# Patient Record
Sex: FEMALE | Employment: OTHER
[De-identification: names, ages, dates, MRNs, and addresses within clinical notes are randomized per-mention and may not be internally consistent; named-entity substitution may affect disease eponyms.]

---

## 2017-06-17 ENCOUNTER — HEALTH MAINTENANCE LETTER (OUTPATIENT)
Age: 52
End: 2017-06-17

## 2020-09-29 ENCOUNTER — MEDICAL CORRESPONDENCE (OUTPATIENT)
Dept: HEALTH INFORMATION MANAGEMENT | Facility: CLINIC | Age: 55
End: 2020-09-29

## 2020-09-29 ENCOUNTER — TRANSFERRED RECORDS (OUTPATIENT)
Dept: HEALTH INFORMATION MANAGEMENT | Facility: CLINIC | Age: 55
End: 2020-09-29

## 2020-09-30 ENCOUNTER — TRANSCRIBE ORDERS (OUTPATIENT)
Dept: OTHER | Age: 55
End: 2020-09-30

## 2020-09-30 DIAGNOSIS — I63.9 LEFT PONTINE STROKE (H): ICD-10-CM

## 2020-09-30 DIAGNOSIS — H53.2 DIPLOPIA: Primary | ICD-10-CM

## 2020-10-01 NOTE — TELEPHONE ENCOUNTER
FUTURE VISIT INFORMATION      FUTURE VISIT INFORMATION:    Date: 10/14/20    Time: 9:30am    [place] [facility] [place] INFORMATION:    Referring provider:  Dr. Llanos    Referring providers clinic:  [facility]    Reason for visit/diagnosis  diplopia after left pontine stroke    RECORDS REQUESTED FROM:       Clinic name Comments Records Status Imaging Status   [facility] Request for recs sent 10/1- received and sent to scanning EPIC

## 2020-10-14 ENCOUNTER — PRE VISIT (OUTPATIENT)
Dept: OPHTHALMOLOGY | Facility: CLINIC | Age: 55
End: 2020-10-14

## 2020-10-14 ENCOUNTER — OFFICE VISIT (OUTPATIENT)
Dept: OPHTHALMOLOGY | Facility: CLINIC | Age: 55
End: 2020-10-14

## 2020-10-14 DIAGNOSIS — I63.9 LEFT PONTINE STROKE (H): ICD-10-CM

## 2020-10-14 DIAGNOSIS — H53.2 DIPLOPIA: ICD-10-CM

## 2020-10-14 DIAGNOSIS — H53.10 SUBJECTIVE VISUAL DISTURBANCE: Primary | ICD-10-CM

## 2020-10-14 DIAGNOSIS — E11.3292 TYPE 2 DIABETES MELLITUS WITH LEFT EYE AFFECTED BY MILD NONPROLIFERATIVE RETINOPATHY WITHOUT MACULAR EDEMA, WITHOUT LONG-TERM CURRENT USE OF INSULIN (H): ICD-10-CM

## 2020-10-14 DIAGNOSIS — H49.21 SIXTH NERVE PALSY, RIGHT: Primary | ICD-10-CM

## 2020-10-14 PROCEDURE — 99244 OFF/OP CNSLTJ NEW/EST MOD 40: CPT | Performed by: OPHTHALMOLOGY

## 2020-10-14 PROCEDURE — 92060 SENSORIMOTOR EXAMINATION: CPT | Performed by: OPHTHALMOLOGY

## 2020-10-14 ASSESSMENT — CONF VISUAL FIELD
OD_NORMAL: 1
OS_NORMAL: 1

## 2020-10-14 ASSESSMENT — SLIT LAMP EXAM - LIDS
COMMENTS: MILD BLEPHARITIS
COMMENTS: MILD BLEPHARITIS

## 2020-10-14 ASSESSMENT — TONOMETRY
IOP_METHOD: ICARE
OS_IOP_MMHG: 15
OD_IOP_MMHG: 16

## 2020-10-14 ASSESSMENT — VISUAL ACUITY
OD_SC: 20/25
OS_SC: 20/25
OS_SC+: -2
OD_SC+: -2
METHOD: SNELLEN - LINEAR

## 2020-10-14 ASSESSMENT — CUP TO DISC RATIO
OD_RATIO: 0.3
OS_RATIO: 0.3

## 2020-10-14 NOTE — Clinical Note
10/14/2020       RE: Jayda Morris  [address] [place] [id number]     Dear Colleague,    Thank you for referring your patient, Jayda Morris, to the [facility]PHTH[facility] [place] [facility]. Please see a copy of my visit note below.         Assessment & Plan     Jayda Morris is a 55 year old female with the following diagnoses:   1. Diplopia    2. Left pontine stroke (H)    3. Subjective visual disturbance         Patient was sent for consultation by Dr. Tad Reed for diplopia.  This began 3 weeks ago.  It has been improving since onset.  In July had a left pontine stroke.  She had developed right arm weakness, right facial droop, slurred speech       IMAGING:  EXAM: MRI BRAIN, [place] OF SAL[place] MRA, CAROTID MRA  7/2/2020 4:58 PM.     CLINICAL INFORMATION: .  Right face droop, tongue deviation and right upper extremity weakness     COMPARISON: CT head without contrast July 2, 2020     TECHNIQUE:  BRAIN: Sagittal T1, axial DWI, axial and coronal FLAIR, axial T2, axial MPGR.  [place] OF [place] MRA: 3-D time-of-flight.  CAROTID MRA: Unenhanced and enhanced time-of-flight.     CONTRAST: 10 cc of intravenous Gadavist was administered during the exam.     FINDINGS:     BRAIN:  No aggressive marrow lesion. Corpus callosum normally formed. Posterior fossa structures normally positioned. Sella and pituitary gland unremarkable. A 16 mm acute infarct left edin. No hemorrhage or mass effect. Mild parenchymal volume loss. Mild to moderate chronic microvascular ischemic change. No hydrocephalus. Mild inflammatory mucosal thickening in the ethmoid air cells. Nonspecific foci susceptibility along the left cerebellar tonsil probably related to chronic microhemorrhage.     [place] OF SALDIVAR MRA:  [place] vessels are widely patent.  No evidence of intracranial arterial occlusive disease, aneurysm, or other abnormality.     CAROTID/VERTEBRAL MRA:  AORTIC  ARCH: Aortic arch and proximal great vessels are unremarkable in appearance.     RIGHT CAROTID: Common carotid is widely patent. Carotid bifurcation demonstrates a normal appearance. Internal carotid is widely patent.     LEFT CAROTID: Common carotid is widely patent. Carotid bifurcation demonstrates a normal appearance. Internal carotid is widely patent.     POSTERIOR CIRCULATION: Both vertebral arteries and basilar artery are widely patent. No posterior circulation abnormality demonstrated.     IMPRESSION  IMPRESSION:     1. 16 mm acute infarct left edin. No hemorrhage or mass effect.  2. Mild parenchymal volume loss. Mild to moderate chronic microvascular ischemic change. No hydrocephalus.  3. No significant intracranial or extracranial arterial stenosis identified..       Echo was normal.  Blood pressure was elevated. Cholesterol was elevated.  She started lisinopril and atorvastatin.      Visual acuity 20/25 both eyes.  She has esotropia that is worse in RIGHT gaze indicating right sixth nerve palsy.  All other cranial nerves intact.  Anterior segment exam with nuclear sclerosis and cortical cataracts RIGHT eye and nuclear sclerosis left eye.  Fundus exam with cotton wool spot superior to nerve and mild to moderate nonproliferative diabetic retinopathy.      It is my impression that patient has a right sixth nerve palsy.  This appears to be neurologically isolated.  She does not have a history of cancer.  This is likely microvascular in nature as patient reports improvement already.  I will hold off on further imaging due to lack of insurance.  I will tentatively give her a follow up in 6-8 weeks.  If the diplopia resolves before that time she can cancel the appointment, but I would like to see her again if worsening at any time.     Patient also has diabetic retinopathy.  Patient thinks her last hemoglobin A1c was approximately 14.  Discussed risk of blindness without good blood sugar control.  I will have her  see a retina specialist in 9 months or sooner as needed with worsening vision.             Attending Physician Attestation:  Complete documentation of historical and exam elements from today's encounter can be found in the full encounter summary report (not reduplicated in this progress note).  I personally obtained the chief complaint(s) and history of present illness.  I confirmed and edited as necessary the review of systems, past medical/surgical history, family history, social history, and examination findings as documented by others; and I examined the patient myself.  I personally reviewed the relevant tests, images, and reports as documented above.  I formulated and edited as necessary the assessment and plan and discussed the findings and management plan with the patient and family. - Vicente Gan MD            Again, thank you for allowing me to participate in the care of your patient.      Sincerely,    Vicente Gan MD

## 2020-10-14 NOTE — LETTER
10/14/2020         RE:  :  MRN: Jayda Morris  1965  [id number]     Dear Dr. Reed,    Thank you for asking me to see your very pleasant patient, Jayda Morris, in neuro-ophthalmic consultation.  I would like to thank you for sending your records and I have summarized them in the history of present illness.   My assessment and plan are below.  For further details, please see my attached clinic note.      Assessment & Plan     Jayda Morris is a 55 year old female with the following diagnoses:   1. Diplopia    2. Left pontine stroke (H)    3. Subjective visual disturbance         Patient was sent for consultation by Dr. Tad Reed for diplopia.  This began 3 weeks ago.  It has been improving since onset.  In July had a left pontine stroke.  She had developed right arm weakness, right facial droop, slurred speech       IMAGING:  EXAM: MRI BRAIN, [place] OF SALDIVAR MRA, CAROTID MRA  2020 4:58 PM.     CLINICAL INFORMATION: .  Right face droop, tongue deviation and right upper extremity weakness     COMPARISON: CT head without contrast 2020     TECHNIQUE:  BRAIN: Sagittal T1, axial DWI, axial and coronal FLAIR, axial T2, axial MPGR.  [place] OF SALDIVAR MRA: 3-D time-of-flight.  CAROTID MRA: Unenhanced and enhanced time-of-flight.     CONTRAST: 10 cc of intravenous Gadavist was administered during the exam.     FINDINGS:     BRAIN:  No aggressive marrow lesion. Corpus callosum normally formed. Posterior fossa structures normally positioned. Sella and pituitary gland unremarkable. A 16 mm acute infarct left edin. No hemorrhage or mass effect. Mild parenchymal volume loss. Mild to moderate chronic microvascular ischemic change. No hydrocephalus. Mild inflammatory mucosal thickening in the ethmoid air cells. Nonspecific foci susceptibility along the left cerebellar tonsil probably related to chronic microhemorrhage.     [place] OF SALDIVAR MRA:  [place] of [place] vessels are widely patent.  No  evidence of intracranial arterial occlusive disease, aneurysm, or other abnormality.     CAROTID/VERTEBRAL MRA:  AORTIC ARCH: Aortic arch and proximal great vessels are unremarkable in appearance.     RIGHT CAROTID: Common carotid is widely patent. Carotid bifurcation demonstrates a normal appearance. Internal carotid is widely patent.     LEFT CAROTID: Common carotid is widely patent. Carotid bifurcation demonstrates a normal appearance. Internal carotid is widely patent.     POSTERIOR CIRCULATION: Both vertebral arteries and basilar artery are widely patent. No posterior circulation abnormality demonstrated.     IMPRESSION  IMPRESSION:     1. 16 mm acute infarct left edin. No hemorrhage or mass effect.  2. Mild parenchymal volume loss. Mild to moderate chronic microvascular ischemic change. No hydrocephalus.  3. No significant intracranial or extracranial arterial stenosis identified..       Echo was normal.  Blood pressure was elevated. Cholesterol was elevated.  She started lisinopril and atorvastatin.      Visual acuity 20/25 both eyes.  She has esotropia that is worse in RIGHT gaze indicating right sixth nerve palsy.  All other cranial nerves intact.  Anterior segment exam with nuclear sclerosis and cortical cataracts RIGHT eye and nuclear sclerosis left eye.  Fundus exam with cotton wool spot superior to nerve and mild to moderate nonproliferative diabetic retinopathy.      It is my impression that patient has a right sixth nerve palsy.  This appears to be neurologically isolated.  She does not have a history of cancer.  This is likely microvascular in nature as patient reports improvement already.  I will hold off on further imaging due to lack of insurance.  I will tentatively give her a follow up in 6-8 weeks.  If the diplopia resolves before that time she can cancel the appointment, but I would like to see her again if worsening at any time.     Patient also has diabetic retinopathy.  Patient thinks her  last hemoglobin A1c was approximately 14.  Discussed risk of blindness without good blood sugar control.  I will have her see a retina specialist in 9 months or sooner as needed with worsening vision.            Again, thank you for allowing me to participate in the care of your patient.      Sincerely,    Vicente Gan MD  Professor  Ophthalmology Residency   Director of Neuro-Ophthalmology  [employer] [facility] [employer] Endowed Chair  Departments of Ophthalmology, Neurology, and Neurosurgery  [facility] [address] [place] [address]  [id number]  T - [phone]   - [phone]  EDWIN [email or web address]      CC: Tad Reed, YARIEL  [facility] [address] [place] [address]  Via Fax: [phone]     Lincoln Hartley MD  [address] [place] [address]  Via In Basket    DX = 6th nerve palsy

## 2020-10-14 NOTE — NURSING NOTE
Chief Complaints and History of Present Illnesses   Patient presents with     Diplopia Evaluation     Chief Complaint(s) and History of Present Illness(es)     Diplopia Evaluation               Comments     Jayda Morris is a 55 year old female who presents today for    1. Diplopia  Secondary to CVA 2 months.  Significant improvement in diplopia since onset.   Diplopia more noticeable at distance. Better able to navigate her environment.     Isabel ROD 9:28 AM October 14, 2020

## 2020-10-14 NOTE — PROGRESS NOTES
Assessment & Plan     Jayda Morris is a 55 year old female with the following diagnoses:   1. Diplopia    2. Left pontine stroke (H)    3. Subjective visual disturbance         Patient was sent for consultation by Dr. Tad Reed for diplopia.  This began 3 weeks ago.  It has been improving since onset.  In July had a left pontine stroke.  She had developed right arm weakness, right facial droop, slurred speech       IMAGING:  EXAM: MRI BRAIN, [place] OF SALDIVAR MRA, CAROTID MRA  7/2/2020 4:58 PM.     CLINICAL INFORMATION: .  Right face droop, tongue deviation and right upper extremity weakness     COMPARISON: CT head without contrast July 2, 2020     TECHNIQUE:  BRAIN: Sagittal T1, axial DWI, axial and coronal FLAIR, axial T2, axial MPGR.  [place] OF SALDIVAR MRA: 3-D time-of-flight.  CAROTID MRA: Unenhanced and enhanced time-of-flight.     CONTRAST: 10 cc of intravenous Gadavist was administered during the exam.     FINDINGS:     BRAIN:  No aggressive marrow lesion. Corpus callosum normally formed. Posterior fossa structures normally positioned. Sella and pituitary gland unremarkable. A 16 mm acute infarct left edin. No hemorrhage or mass effect. Mild parenchymal volume loss. Mild to moderate chronic microvascular ischemic change. No hydrocephalus. Mild inflammatory mucosal thickening in the ethmoid air cells. Nonspecific foci susceptibility along the left cerebellar tonsil probably related to chronic microhemorrhage.     [place] OF SALDIVAR MRA:  [place] of [place] vessels are widely patent.  No evidence of intracranial arterial occlusive disease, aneurysm, or other abnormality.     CAROTID/VERTEBRAL MRA:  AORTIC ARCH: Aortic arch and proximal great vessels are unremarkable in appearance.     RIGHT CAROTID: Common carotid is widely patent. Carotid bifurcation demonstrates a normal appearance. Internal carotid is widely patent.     LEFT CAROTID: Common carotid is widely patent. Carotid bifurcation demonstrates a  normal appearance. Internal carotid is widely patent.     POSTERIOR CIRCULATION: Both vertebral arteries and basilar artery are widely patent. No posterior circulation abnormality demonstrated.     IMPRESSION  IMPRESSION:     1. 16 mm acute infarct left edin. No hemorrhage or mass effect.  2. Mild parenchymal volume loss. Mild to moderate chronic microvascular ischemic change. No hydrocephalus.  3. No significant intracranial or extracranial arterial stenosis identified..       Echo was normal.  Blood pressure was elevated. Cholesterol was elevated.  She started lisinopril and atorvastatin.      Visual acuity 20/25 both eyes.  She has esotropia that is worse in RIGHT gaze indicating right sixth nerve palsy.  All other cranial nerves intact.  Anterior segment exam with nuclear sclerosis and cortical cataracts RIGHT eye and nuclear sclerosis left eye.  Fundus exam with cotton wool spot superior to nerve and mild to moderate nonproliferative diabetic retinopathy.      It is my impression that patient has a right sixth nerve palsy.  This appears to be neurologically isolated.  She does not have a history of cancer.  This is likely microvascular in nature as patient reports improvement already.  I will hold off on further imaging due to lack of insurance.  I will tentatively give her a follow up in 6-8 weeks.  If the diplopia resolves before that time she can cancel the appointment, but I would like to see her again if worsening at any time.     Patient also has diabetic retinopathy.  Patient thinks her last hemoglobin A1c was approximately 14.  Discussed risk of blindness without good blood sugar control.  I will have her see a retina specialist in 9 months or sooner as needed with worsening vision.             Attending Physician Attestation:  Complete documentation of historical and exam elements from today's encounter can be found in the full encounter summary report (not reduplicated in this progress note).  I  personally obtained the chief complaint(s) and history of present illness.  I confirmed and edited as necessary the review of systems, past medical/surgical history, family history, social history, and examination findings as documented by others; and I examined the patient myself.  I personally reviewed the relevant tests, images, and reports as documented above.  I formulated and edited as necessary the assessment and plan and discussed the findings and management plan with the patient and family. - Vicente Gan MD

## 2020-11-09 ENCOUNTER — DOCUMENTATION ONLY (OUTPATIENT)
Dept: CARE COORDINATION | Facility: CLINIC | Age: 55
End: 2020-11-09

## 2021-07-06 DIAGNOSIS — E11.3292 TYPE 2 DIABETES MELLITUS WITH LEFT EYE AFFECTED BY MILD NONPROLIFERATIVE RETINOPATHY WITHOUT MACULAR EDEMA, WITHOUT LONG-TERM CURRENT USE OF INSULIN (H): Primary | ICD-10-CM

## 2022-04-11 ENCOUNTER — TRANSCRIBE ORDERS (OUTPATIENT)
Dept: OTHER | Age: 57
End: 2022-04-11
Payer: COMMERCIAL

## 2022-04-11 DIAGNOSIS — I63.9 CEREBRAL INFARCTION, UNSPECIFIED (H): Primary | ICD-10-CM

## 2022-12-02 PROCEDURE — 88305 TISSUE EXAM BY PATHOLOGIST: CPT | Mod: 26 | Performed by: PATHOLOGY

## 2022-12-02 PROCEDURE — 88305 TISSUE EXAM BY PATHOLOGIST: CPT | Mod: TC,ORL | Performed by: INTERNAL MEDICINE

## 2022-12-05 ENCOUNTER — LAB REQUISITION (OUTPATIENT)
Dept: LAB | Facility: CLINIC | Age: 57
End: 2022-12-05
Payer: COMMERCIAL

## 2022-12-05 DIAGNOSIS — K57.30 DIVERTICULOSIS OF LARGE INTESTINE WITHOUT PERFORATION OR ABSCESS WITHOUT BLEEDING: ICD-10-CM

## 2022-12-05 DIAGNOSIS — Z12.11 ENCOUNTER FOR SCREENING FOR MALIGNANT NEOPLASM OF COLON: ICD-10-CM

## 2022-12-05 DIAGNOSIS — D12.5 BENIGN NEOPLASM OF SIGMOID COLON: ICD-10-CM

## 2022-12-07 LAB
PATH REPORT.COMMENTS IMP SPEC: NORMAL
PATH REPORT.COMMENTS IMP SPEC: NORMAL
PATH REPORT.FINAL DX SPEC: NORMAL
PATH REPORT.GROSS SPEC: NORMAL
PATH REPORT.MICROSCOPIC SPEC OTHER STN: NORMAL
PATH REPORT.RELEVANT HX SPEC: NORMAL
PHOTO IMAGE: NORMAL